# Patient Record
Sex: MALE | Race: WHITE | NOT HISPANIC OR LATINO
[De-identification: names, ages, dates, MRNs, and addresses within clinical notes are randomized per-mention and may not be internally consistent; named-entity substitution may affect disease eponyms.]

---

## 2020-01-09 ENCOUNTER — INBOUND DOCUMENT (OUTPATIENT)
Age: 55
End: 2020-01-09

## 2020-01-09 ENCOUNTER — APPOINTMENT (OUTPATIENT)
Dept: VASCULAR SURGERY | Facility: CLINIC | Age: 55
End: 2020-01-09
Payer: OTHER MISCELLANEOUS

## 2020-01-09 ENCOUNTER — EMERGENCY (EMERGENCY)
Facility: HOSPITAL | Age: 55
LOS: 1 days | Discharge: HOME | End: 2020-01-09
Attending: EMERGENCY MEDICINE | Admitting: EMERGENCY MEDICINE
Payer: OTHER MISCELLANEOUS

## 2020-01-09 VITALS
HEART RATE: 77 BPM | TEMPERATURE: 97 F | OXYGEN SATURATION: 98 % | DIASTOLIC BLOOD PRESSURE: 88 MMHG | RESPIRATION RATE: 18 BRPM | WEIGHT: 244.49 LBS | SYSTOLIC BLOOD PRESSURE: 156 MMHG

## 2020-01-09 VITALS
TEMPERATURE: 98 F | RESPIRATION RATE: 16 BRPM | DIASTOLIC BLOOD PRESSURE: 71 MMHG | OXYGEN SATURATION: 100 % | SYSTOLIC BLOOD PRESSURE: 114 MMHG | HEART RATE: 74 BPM

## 2020-01-09 PROBLEM — Z00.00 ENCOUNTER FOR PREVENTIVE HEALTH EXAMINATION: Status: ACTIVE | Noted: 2020-01-09

## 2020-01-09 PROCEDURE — 75574 CT ANGIO HRT W/3D IMAGE: CPT | Mod: 26

## 2020-01-09 PROCEDURE — 71045 X-RAY EXAM CHEST 1 VIEW: CPT

## 2020-01-09 PROCEDURE — 99284 EMERGENCY DEPT VISIT MOD MDM: CPT | Mod: 25

## 2020-01-09 PROCEDURE — 93010 ELECTROCARDIOGRAM REPORT: CPT

## 2020-01-09 PROCEDURE — 82553 CREATINE MB FRACTION: CPT

## 2020-01-09 PROCEDURE — 85379 FIBRIN DEGRADATION QUANT: CPT

## 2020-01-09 PROCEDURE — 80053 COMPREHEN METABOLIC PANEL: CPT

## 2020-01-09 PROCEDURE — 85610 PROTHROMBIN TIME: CPT

## 2020-01-09 PROCEDURE — 99285 EMERGENCY DEPT VISIT HI MDM: CPT

## 2020-01-09 PROCEDURE — 75574 CT ANGIO HRT W/3D IMAGE: CPT

## 2020-01-09 PROCEDURE — 93970 EXTREMITY STUDY: CPT

## 2020-01-09 PROCEDURE — 85730 THROMBOPLASTIN TIME PARTIAL: CPT

## 2020-01-09 PROCEDURE — 85025 COMPLETE CBC W/AUTO DIFF WBC: CPT

## 2020-01-09 PROCEDURE — 82550 ASSAY OF CK (CPK): CPT

## 2020-01-09 PROCEDURE — 93005 ELECTROCARDIOGRAM TRACING: CPT | Mod: XU

## 2020-01-09 PROCEDURE — 71045 X-RAY EXAM CHEST 1 VIEW: CPT | Mod: 26

## 2020-01-09 PROCEDURE — 84484 ASSAY OF TROPONIN QUANT: CPT

## 2020-01-09 PROCEDURE — 96374 THER/PROPH/DIAG INJ IV PUSH: CPT | Mod: XU

## 2020-01-09 PROCEDURE — 36415 COLL VENOUS BLD VENIPUNCTURE: CPT

## 2020-01-09 RX ORDER — ASPIRIN/CALCIUM CARB/MAGNESIUM 324 MG
1 TABLET ORAL
Qty: 0 | Refills: 0 | DISCHARGE

## 2020-01-09 RX ORDER — KETOROLAC TROMETHAMINE 30 MG/ML
30 SYRINGE (ML) INJECTION ONCE
Refills: 0 | Status: DISCONTINUED | OUTPATIENT
Start: 2020-01-09 | End: 2020-01-09

## 2020-01-09 RX ORDER — AMLODIPINE BESYLATE 2.5 MG/1
0 TABLET ORAL
Qty: 0 | Refills: 0 | DISCHARGE

## 2020-01-09 RX ORDER — METOPROLOL TARTRATE 50 MG
100 TABLET ORAL ONCE
Refills: 0 | Status: COMPLETED | OUTPATIENT
Start: 2020-01-09 | End: 2020-01-09

## 2020-01-09 RX ORDER — ROSUVASTATIN CALCIUM 5 MG/1
0 TABLET ORAL
Qty: 0 | Refills: 0 | DISCHARGE

## 2020-01-09 RX ADMIN — Medication 30 MILLIGRAM(S): at 13:49

## 2020-01-09 RX ADMIN — Medication 100 MILLIGRAM(S): at 14:56

## 2020-01-09 NOTE — ED PROVIDER NOTE - CLINICAL SUMMARY MEDICAL DECISION MAKING FREE TEXT BOX
pt currently getting clearance for r knee surg - had dopplers done today and neg for dvt, presents stating that had some chest twinges earlier and here for eval - no associated sob/dizziness/diaphoresis/syncope, non exertional, cp free in ed, no known hx of cad - has seen cards for clearance, ekg non ischemic, labs wnl -no pe risk factors and neg d dimer, cta coronaries done and no obstructive dz - elevated ca score noted  and discussed w/cards - no inpatient tx indicated - to f/u w/dr rodriguez in office. pt happy w/plan, understands and agrees, strict return precautions given

## 2020-01-09 NOTE — ED PROVIDER NOTE - PATIENT PORTAL LINK FT
You can access the FollowMyHealth Patient Portal offered by [facility] by registering at the following website: [email or web address]. By joining [facility]’s FollowMyHealth portal, you will also be able to view your health information using other applications (apps) compatible with our system.

## 2020-01-09 NOTE — ED ADULT NURSE REASSESSMENT NOTE - NS ED NURSE REASSESS COMMENT FT1
report given to Michael Colon from CTA holding. PT pending transfer to [facility] w/o monitor as per physician.

## 2020-01-09 NOTE — ED ADULT NURSE NOTE - OBJECTIVE STATEMENT
Pt came to ED from US, c/o midsternal chest pressure that lasted a few minutes while getting US done of his right lower leg today.  Denies cough, fever, chills.  Also c/o BLE swelling that started a few days ago.  Pt denies D/N/V, fever or chills.  Chest pressure is intermittent.  Pt speaking in complete, clear sentences, lungs sounds clear bilaterally.  Pt is alert and oriented x3.

## 2020-01-09 NOTE — ED PROVIDER NOTE - ATTENDING CONTRIBUTION TO CARE
I discussed the plan of care of the patient directly with the PA and examined the patient while in the Emergency Department. I agree with the HPI and PE as documented by the PA.  Pt p/w swelling to lle, seen by orthopedist and arranged for doppler to r/o dvt. While undergoing us, pt developed chest pain. Pt has been seen by a cardiologist recently and received cardiac clearance for upcoming orthopedic surgery. Pt is cp free in ed. + anxious. Trop and d-dimer neg. Pt arranged for cardiac cta to r/o obstructive dz. Will awaiting cta results for dispo. I discussed the plan of care of the patient directly with the PA and examined the patient while in the Emergency Department. I agree with the HPI and PE as documented by the PA.  Pt p/w chest pain described as "twinges" to chest, while obtaining dopplers of lower ext today. + anxious. no associated sob, palp, dizziness, syncope, back pain/ radiation. Pt is cp free in ed. Afebrile. HDS. WNWD m in nad. + anxious. + s1, s2, rrr. Lungs cta b/l. Abd benign. 2+ pulses b/l. Trop and d-dimer neg. Pt arranged for cardiac cta to r/o obstructive dz. Dispo pending cta results and pt re-assessment.

## 2020-01-09 NOTE — ED PROVIDER NOTE - PROGRESS NOTE DETAILS
[place]: received s/o pending CT results. Pt feeling much better. CT w/ no acute pathology. Does whow "Mild aneurysmal dilatation of the aortic root measuring up to 4.2 cm." Clinically no indication for further emergent ED workup or hospitalization at this time. Comfortable for dc, outpt f/u.

## 2020-01-09 NOTE — ED PROVIDER NOTE - CARE PROVIDER_API CALL
Blanquita Breen)  Cardiovascular Disease; Internal Medicine  [address] [place] [address]  Phone: [phone]  Fax: [phone]  Follow Up Time:

## 2020-01-09 NOTE — ED PROVIDER NOTE - OBJECTIVE STATEMENT
The pt is a 55 y/o M, who presents to ED stating he's getting clearance for elective knee surg, has been under lots of stress and today having "twinges" in chest - pt has seen cards in anticipation of surg and told that he's fine. Pt had neg dopplers today (of leg for ortho clearance). Pt states pain is 2/10, feels like "twinges", not related to activity, worse w/mov, non radiating, has not taken any pain meds. Denies sob, dyspnea, palpitations, n/v, dizziness, syncope, fevers, chills

## 2020-01-09 NOTE — ED ADULT TRIAGE NOTE - CHIEF COMPLAINT QUOTE
c/o midsternal chest pressure that lasted a few minutes while getting US done of his right lower leg today.  Denies cough, fever, chills.  Also c/o BLE swelling that started a few days ago

## 2020-01-09 NOTE — ED ADULT NURSE NOTE - CHPI ED NUR SYMPTOMS NEG
no chills/no back pain/no syncope/no diaphoresis/no shortness of breath/no dizziness/no nausea/no fever/no vomiting/no congestion

## 2020-01-10 PROBLEM — I10 ESSENTIAL (PRIMARY) HYPERTENSION: Chronic | Status: ACTIVE | Noted: 2020-01-09

## 2020-01-10 PROBLEM — E78.00 PURE HYPERCHOLESTEROLEMIA, UNSPECIFIED: Chronic | Status: ACTIVE | Noted: 2020-01-09

## 2020-01-16 ENCOUNTER — APPOINTMENT (OUTPATIENT)
Dept: HEART AND VASCULAR | Facility: CLINIC | Age: 55
End: 2020-01-16
Payer: COMMERCIAL

## 2020-01-16 ENCOUNTER — NON-APPOINTMENT (OUTPATIENT)
Age: 55
End: 2020-01-16

## 2020-01-16 VITALS
DIASTOLIC BLOOD PRESSURE: 68 MMHG | SYSTOLIC BLOOD PRESSURE: 104 MMHG | BODY MASS INDEX: 33.6 KG/M2 | HEIGHT: 71 IN | WEIGHT: 240 LBS | HEART RATE: 76 BPM | OXYGEN SATURATION: 98 %

## 2020-01-16 DIAGNOSIS — I25.10 ATHEROSCLEROTIC HEART DISEASE OF NATIVE CORONARY ARTERY W/OUT ANGINA PECTORIS: ICD-10-CM

## 2020-01-16 DIAGNOSIS — R07.89 OTHER CHEST PAIN: ICD-10-CM

## 2020-01-16 DIAGNOSIS — I10 ESSENTIAL (PRIMARY) HYPERTENSION: ICD-10-CM

## 2020-01-16 DIAGNOSIS — Z79.899 OTHER LONG TERM (CURRENT) DRUG THERAPY: ICD-10-CM

## 2020-01-16 PROCEDURE — 99202 OFFICE O/P NEW SF 15 MIN: CPT

## 2020-01-16 PROCEDURE — 93000 ELECTROCARDIOGRAM COMPLETE: CPT

## 2020-01-16 RX ORDER — ASPIRIN 81 MG/1
81 TABLET, DELAYED RELEASE ORAL DAILY
Qty: 90 | Refills: 3 | Status: ACTIVE | COMMUNITY
Start: 2020-01-16

## 2020-01-16 RX ORDER — AMLODIPINE BESYLATE 5 MG/1
5 TABLET ORAL DAILY
Qty: 90 | Refills: 3 | Status: ACTIVE | COMMUNITY
Start: 2020-01-16

## 2020-01-16 RX ORDER — ROSUVASTATIN CALCIUM 20 MG/1
20 TABLET, FILM COATED ORAL DAILY
Qty: 90 | Refills: 3 | Status: ACTIVE | COMMUNITY
Start: 2020-01-16

## 2020-01-20 NOTE — HISTORY OF PRESENT ILLNESS
[FreeTextEntry1] : \par 54 M with stable CAD on OMT no new complaints\par Interval Symptoms: EKG today unchanged from prior (in chart)\par usual state of health, no new complaints\par SOB - no\par chest pain - no\par location: chest\par duration: none\par modifying factors: none\par timing: none\par severity: 0/10\par Medications: the patient is adherent with his medication regimen. denies medication side effects. \par Disease Management: the patient is doing well with goals.

## 2020-01-20 NOTE — PHYSICAL EXAM
[Normal Appearance] : normal appearance [General Appearance - Well Developed] : well developed [General Appearance - Well Nourished] : well nourished [No Deformities] : no deformities [Well Groomed] : well groomed [Normal Conjunctiva] : the conjunctiva exhibited no abnormalities [General Appearance - In No Acute Distress] : no acute distress [No Oral Pallor] : no oral pallor [Normal Oral Mucosa] : normal oral mucosa [Eyelids - No Xanthelasma] : the eyelids demonstrated no xanthelasmas [No Oral Cyanosis] : no oral cyanosis [Normal Jugular Venous A Waves Present] : normal jugular venous A waves present [Normal Jugular Venous V Waves Present] : normal jugular venous V waves present [Heart Rate And Rhythm] : heart rate and rhythm were normal [No Jugular Venous Seth A Waves] : no jugular venous seth A waves [Heart Sounds] : normal S1 and S2 [Murmurs] : no murmurs present [Respiration, Rhythm And Depth] : normal respiratory rhythm and effort [Auscultation Breath Sounds / Voice Sounds] : lungs were clear to auscultation bilaterally [Exaggerated Use Of Accessory Muscles For Inspiration] : no accessory muscle use [Abdomen Soft] : soft [Abdomen Tenderness] : non-tender [Abdomen Mass (___ Cm)] : no abdominal mass palpated [Nail Clubbing] : no clubbing of the fingernails [Gait - Sufficient For Exercise Testing] : the gait was sufficient for exercise testing [Abnormal Walk] : normal gait [Petechial Hemorrhages (___cm)] : no petechial hemorrhages [Cyanosis, Localized] : no localized cyanosis [Skin Color & Pigmentation] : normal skin color and pigmentation [] : no rash [No Venous Stasis] : no venous stasis [No Xanthoma] : no  xanthoma was observed [No Skin Ulcers] : no skin ulcer [Skin Lesions] : no skin lesions [Mood] : the mood was normal [Oriented To Time, Place, And Person] : oriented to person, place, and time [Affect] : the affect was normal [No Anxiety] : not feeling anxious

## 2020-01-20 NOTE — DISCUSSION/SUMMARY
[Stable] : stable [Stable Angina] : stable angina [Outpatient Evaluation] : outpatient evaluation [Echocardiogram] : echocardiogram [Medication Changes Per Orders] : as documented in orders

## 2020-06-11 ENCOUNTER — APPOINTMENT (OUTPATIENT)
Dept: HEART AND VASCULAR | Facility: CLINIC | Age: 55
End: 2020-06-11

## 2021-02-09 ENCOUNTER — APPOINTMENT (OUTPATIENT)
Dept: HEART AND VASCULAR | Facility: CLINIC | Age: 56
End: 2021-02-09

## 2021-02-18 NOTE — ED PROVIDER NOTE - MUSCULOSKELETAL, MLM
Transition of care outreach postponed for 7 days due to patient's discharge to inpatient rehab facility.   [facility] 1/18-2/17/21 Acute resp fx hyoixia d/c [facility] f/u 7d Spine appears normal, range of motion is not limited, no muscle or joint tenderness; LE no calf tend b/l, FROM b/l, pulses 2+ b/l

## 2022-01-04 NOTE — ED ADULT NURSE NOTE - NS ED NURSE IV DC DT
In the event of a negative COVID PCR, discontinue quarantine if the following conditions have been met:  -    No fever (<99.0 F/37.2 C) for at least 24 hours without the use of fever-reducing medications          AND  -    Respiratory symptoms have improved or resolved (e.g. cough, shortness of breath)     In the event of a positive COVID PCR, regardless of vaccination status:  • Stay home for 5 days from onset of symptoms.  • If you have no symptoms or your symptoms are resolving after 5 days, you can leave your house.  • Continue to wear a mask around others for 5 additional days.    
09-Jan-2020 18:25

## 2023-04-19 ENCOUNTER — APPOINTMENT (OUTPATIENT)
Dept: ORTHOPEDIC SURGERY | Facility: CLINIC | Age: 58
End: 2023-04-19
Payer: OTHER MISCELLANEOUS

## 2023-04-19 DIAGNOSIS — S99.921S UNSPECIFIED INJURY OF RIGHT FOOT, SEQUELA: ICD-10-CM

## 2023-04-19 PROCEDURE — 99204 OFFICE O/P NEW MOD 45 MIN: CPT

## 2023-04-19 PROCEDURE — 73630 X-RAY EXAM OF FOOT: CPT | Mod: RT

## 2023-04-19 PROCEDURE — 73610 X-RAY EXAM OF ANKLE: CPT | Mod: LT

## 2023-04-19 NOTE — HISTORY OF PRESENT ILLNESS
[de-identified] : This is a very pleasant 58-year-old patient well-known to me from my prior practice who presents to establish care here at my new practice.  He suffered a workplace injury which occurred on April 6, 2021.  The injury sustained at that time was an injury to the plantar plate of his right second MTP joint.  We have been treating this with a combination of anti-inflammatory medication, taping, immobilization.  Unfortunately he is still experiencing significant pain involving the right second MTP joint.  In addition secondary to his injury on the right side this has exacerbated prior left ankle pain.  Before the injury this was minimally symptomatic however this is got more more painful for him.  We have also tried injections as well as bracing and anti-inflammatory medication, none of which is giving him any significant relief.

## 2023-04-19 NOTE — DATA REVIEWED
[FreeTextEntry1] : 3 views the patient's right foot and left ankle were ordered and reviewed by me personally.  The x-rays on the right ankle do demonstrate evidence of dorsal subluxation present at the second MTP joint along with a hammertoe.  There is flattening of the second metatarsal head suggesting chondral damage.  Left ankle x-rays do demonstrate evidence of asymmetric tibiotalar joint arthritis with varus tilt and anterior translation of the talus relative to the tibial plafond.  Osteophytes present.  No fracture or dislocation.

## 2023-04-19 NOTE — DISCUSSION/SUMMARY
[de-identified] : I had a lengthy discussion with the patient regarding his condition and treatment options available.  Patient is 100% temporarily disabled.  In regards to his right second toe he has so far failed nonoperative management and wishes to proceed ahead with surgical treatment.  Surgical treatment for his right foot would consist of a Weil osteotomy, possible plantar plate repair, second hammertoe correction, extensor tendon lengthening and dorsal capsulotomy.  We did discuss the risks of this surgery which include but are not limited to infection, need for revision surgery, wound breakdown, hardware infection, symptomatic hardware, recurrence, DVT, PE, loss of limb, loss of life.\par \par We also discussed surgery of the left ankle.  In my opinion the left ankle symptoms are causally related to the right foot injury.  We will have to obtain more advanced imaging but surgery for the left ankle would likely involve a lateral ligament reconstruction with possible total ankle arthroplasty.  All his questions were answered at this time.

## 2023-07-20 ENCOUNTER — FORM ENCOUNTER (OUTPATIENT)
Age: 58
End: 2023-07-20

## 2023-08-04 NOTE — ED PROVIDER NOTE - CPE EDP ENMT NORM
normal... Epidermal Autograft Text: The defect edges were debeveled with a #15 scalpel blade. Given the location of the defect, shape of the defect and the proximity to free margins an epidermal autograft was deemed most appropriate. Using a sterile surgical marker, the primary defect shape was transferred to the donor site. The epidermal graft was then harvested. The skin graft was then placed in the primary defect and oriented appropriately.

## 2023-09-13 ENCOUNTER — APPOINTMENT (OUTPATIENT)
Dept: ORTHOPEDIC SURGERY | Facility: CLINIC | Age: 58
End: 2023-09-13
Payer: OTHER MISCELLANEOUS

## 2023-09-13 DIAGNOSIS — M20.5X1 OTHER DEFORMITIES OF TOE(S) (ACQUIRED), RIGHT FOOT: ICD-10-CM

## 2023-09-13 PROCEDURE — 99213 OFFICE O/P EST LOW 20 MIN: CPT | Mod: 25

## 2023-09-13 PROCEDURE — 20605 DRAIN/INJ JOINT/BURSA W/O US: CPT | Mod: LT

## 2024-01-29 ENCOUNTER — APPOINTMENT (OUTPATIENT)
Dept: ORTHOPEDIC SURGERY | Facility: CLINIC | Age: 59
End: 2024-01-29
Payer: OTHER MISCELLANEOUS

## 2024-01-29 PROCEDURE — 73630 X-RAY EXAM OF FOOT: CPT | Mod: RT

## 2024-01-29 PROCEDURE — 99214 OFFICE O/P EST MOD 30 MIN: CPT

## 2024-02-25 NOTE — DISCUSSION/SUMMARY
[de-identified] : I had a lengthy discussion with the patient regarding his conditions and treatment options available.  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  His ankle condition is likely secondary and certainly has been exacerbated by his workplace environment which requires standing for long periods of time along with lifting, climbing and other activities which increase load across the ankle joint.  The patient would like to proceed forward surgical management.  Surgical management for him would consist of a gastrocnemius recession, total ankle arthroplasty, lateral ankle ligament reconstruction.  Prior to surgery the patient would need to obtain a specialized CT scan of the ankle in order to provide a template for the custom printed cutting blocks necessary for surgery.  Once again the purpose of the CT scan would be for presurgical planning.  We discussed the risks of surgery which included but were not limited to infection, need for revision surgery, persistent pain, failure surgery, DVT, PE, loss of limb, loss of life.  Patient would like to proceed forward surgery.  All questions answered.

## 2024-02-25 NOTE — HISTORY OF PRESENT ILLNESS
[de-identified] : 58-year-old patient comes in today for eval of his left ankle.  He is continuing to have severe pain with weightbearing.  This is all secondary to his workplace environment which requires increased load across the ankle thereby exacerbating and worsening his posttraumatic arthritis.  He has failed all forms of nonoperative management so far including injections, bracing, physical therapy, anti-inflammatory medication.  He wants to proceed forward with surgery.

## 2024-02-25 NOTE — DATA REVIEWED
[FreeTextEntry1] : I reviewed the patient's prior x-rays.  X-rays consistent with advanced tibiotalar joint arthritis with evidence of varus deformity.  There is anterior subluxation of the talus relative to the tibia.

## 2024-02-25 NOTE — PHYSICAL EXAM
[de-identified] : He is alert oriented x 3.  Is pleasant cooperative.  I examined his left lower extremity.  Patient demonstrates mild varus deformity centered at the ankle joint.  His hindfoot is mobile.  He has limited range of motion of the ankle from about neutral dorsiflexion to 30 degrees plantarflexion.  There is pain throughout the entire arc of motion.  There is unstable ankle with anterior drawer and talar tilt test.  He is neurovascular intact distally.  Tender to palpation at the tibiotalar joint alone.

## 2024-06-24 ENCOUNTER — APPOINTMENT (OUTPATIENT)
Dept: ORTHOPEDIC SURGERY | Facility: CLINIC | Age: 59
End: 2024-06-24
Payer: OTHER MISCELLANEOUS

## 2024-06-24 DIAGNOSIS — M19.072 PRIMARY OSTEOARTHRITIS, LEFT ANKLE AND FOOT: ICD-10-CM

## 2024-06-24 PROCEDURE — 99214 OFFICE O/P EST MOD 30 MIN: CPT

## 2024-08-30 ENCOUNTER — OUTPATIENT (OUTPATIENT)
Dept: OUTPATIENT SERVICES | Facility: HOSPITAL | Age: 59
LOS: 1 days | End: 2024-08-30
Payer: COMMERCIAL

## 2024-08-30 ENCOUNTER — RESULT REVIEW (OUTPATIENT)
Age: 59
End: 2024-08-30

## 2024-08-30 VITALS
RESPIRATION RATE: 17 BRPM | HEART RATE: 58 BPM | OXYGEN SATURATION: 97 % | TEMPERATURE: 97 F | SYSTOLIC BLOOD PRESSURE: 146 MMHG | HEIGHT: 71 IN | WEIGHT: 235.01 LBS | DIASTOLIC BLOOD PRESSURE: 70 MMHG

## 2024-08-30 DIAGNOSIS — M19.072 PRIMARY OSTEOARTHRITIS, LEFT ANKLE AND FOOT: ICD-10-CM

## 2024-08-30 DIAGNOSIS — Z01.818 ENCOUNTER FOR OTHER PREPROCEDURAL EXAMINATION: ICD-10-CM

## 2024-08-30 DIAGNOSIS — Z98.890 OTHER SPECIFIED POSTPROCEDURAL STATES: Chronic | ICD-10-CM

## 2024-08-30 LAB
A1C WITH ESTIMATED AVERAGE GLUCOSE RESULT: 5.5 % — SIGNIFICANT CHANGE UP (ref 4–5.6)
ALBUMIN SERPL ELPH-MCNC: 4.8 G/DL — SIGNIFICANT CHANGE UP (ref 3.5–5.2)
ALP SERPL-CCNC: 111 U/L — SIGNIFICANT CHANGE UP (ref 30–115)
ALT FLD-CCNC: 14 U/L — SIGNIFICANT CHANGE UP (ref 0–41)
ANION GAP SERPL CALC-SCNC: 12 MMOL/L — SIGNIFICANT CHANGE UP (ref 7–14)
APTT BLD: 34.1 SEC — SIGNIFICANT CHANGE UP (ref 27–39.2)
AST SERPL-CCNC: 18 U/L — SIGNIFICANT CHANGE UP (ref 0–41)
BASOPHILS # BLD AUTO: 0.07 K/UL — SIGNIFICANT CHANGE UP (ref 0–0.2)
BASOPHILS NFR BLD AUTO: 0.7 % — SIGNIFICANT CHANGE UP (ref 0–1)
BILIRUB SERPL-MCNC: 0.5 MG/DL — SIGNIFICANT CHANGE UP (ref 0.2–1.2)
BLD GP AB SCN SERPL QL: SIGNIFICANT CHANGE UP
BUN SERPL-MCNC: 13 MG/DL — SIGNIFICANT CHANGE UP (ref 10–20)
CALCIUM SERPL-MCNC: 9.8 MG/DL — SIGNIFICANT CHANGE UP (ref 8.4–10.5)
CHLORIDE SERPL-SCNC: 103 MMOL/L — SIGNIFICANT CHANGE UP (ref 98–110)
CO2 SERPL-SCNC: 26 MMOL/L — SIGNIFICANT CHANGE UP (ref 17–32)
CREAT SERPL-MCNC: 0.8 MG/DL — SIGNIFICANT CHANGE UP (ref 0.7–1.5)
EGFR: 101 ML/MIN/1.73M2 — SIGNIFICANT CHANGE UP
EOSINOPHIL # BLD AUTO: 0.19 K/UL — SIGNIFICANT CHANGE UP (ref 0–0.7)
EOSINOPHIL NFR BLD AUTO: 1.8 % — SIGNIFICANT CHANGE UP (ref 0–8)
ESTIMATED AVERAGE GLUCOSE: 111 MG/DL — SIGNIFICANT CHANGE UP (ref 68–114)
GLUCOSE SERPL-MCNC: 87 MG/DL — SIGNIFICANT CHANGE UP (ref 70–99)
HCT VFR BLD CALC: 46.1 % — SIGNIFICANT CHANGE UP (ref 42–52)
HGB BLD-MCNC: 15.4 G/DL — SIGNIFICANT CHANGE UP (ref 14–18)
IMM GRANULOCYTES NFR BLD AUTO: 0.5 % — HIGH (ref 0.1–0.3)
INR BLD: 1.04 RATIO — SIGNIFICANT CHANGE UP (ref 0.65–1.3)
LYMPHOCYTES # BLD AUTO: 2.65 K/UL — SIGNIFICANT CHANGE UP (ref 1.2–3.4)
LYMPHOCYTES # BLD AUTO: 25.6 % — SIGNIFICANT CHANGE UP (ref 20.5–51.1)
MCHC RBC-ENTMCNC: 29 PG — SIGNIFICANT CHANGE UP (ref 27–31)
MCHC RBC-ENTMCNC: 33.4 G/DL — SIGNIFICANT CHANGE UP (ref 32–37)
MCV RBC AUTO: 86.8 FL — SIGNIFICANT CHANGE UP (ref 80–94)
MONOCYTES # BLD AUTO: 0.73 K/UL — HIGH (ref 0.1–0.6)
MONOCYTES NFR BLD AUTO: 7.1 % — SIGNIFICANT CHANGE UP (ref 1.7–9.3)
MRSA PCR RESULT.: NEGATIVE — SIGNIFICANT CHANGE UP
NEUTROPHILS # BLD AUTO: 6.65 K/UL — HIGH (ref 1.4–6.5)
NEUTROPHILS NFR BLD AUTO: 64.3 % — SIGNIFICANT CHANGE UP (ref 42.2–75.2)
NRBC # BLD: 0 /100 WBCS — SIGNIFICANT CHANGE UP (ref 0–0)
PLATELET # BLD AUTO: 241 K/UL — SIGNIFICANT CHANGE UP (ref 130–400)
PMV BLD: 9.7 FL — SIGNIFICANT CHANGE UP (ref 7.4–10.4)
POTASSIUM SERPL-MCNC: 4.5 MMOL/L — SIGNIFICANT CHANGE UP (ref 3.5–5)
POTASSIUM SERPL-SCNC: 4.5 MMOL/L — SIGNIFICANT CHANGE UP (ref 3.5–5)
PROT SERPL-MCNC: 7.4 G/DL — SIGNIFICANT CHANGE UP (ref 6–8)
PROTHROM AB SERPL-ACNC: 11.9 SEC — SIGNIFICANT CHANGE UP (ref 9.95–12.87)
RBC # BLD: 5.31 M/UL — SIGNIFICANT CHANGE UP (ref 4.7–6.1)
RBC # FLD: 14.2 % — SIGNIFICANT CHANGE UP (ref 11.5–14.5)
SODIUM SERPL-SCNC: 141 MMOL/L — SIGNIFICANT CHANGE UP (ref 135–146)
WBC # BLD: 10.34 K/UL — SIGNIFICANT CHANGE UP (ref 4.8–10.8)
WBC # FLD AUTO: 10.34 K/UL — SIGNIFICANT CHANGE UP (ref 4.8–10.8)

## 2024-08-30 PROCEDURE — 87640 STAPH A DNA AMP PROBE: CPT

## 2024-08-30 PROCEDURE — 73610 X-RAY EXAM OF ANKLE: CPT | Mod: LT

## 2024-08-30 PROCEDURE — 99214 OFFICE O/P EST MOD 30 MIN: CPT | Mod: 25

## 2024-08-30 PROCEDURE — 85025 COMPLETE CBC W/AUTO DIFF WBC: CPT

## 2024-08-30 PROCEDURE — 36415 COLL VENOUS BLD VENIPUNCTURE: CPT

## 2024-08-30 PROCEDURE — 85730 THROMBOPLASTIN TIME PARTIAL: CPT

## 2024-08-30 PROCEDURE — 85610 PROTHROMBIN TIME: CPT

## 2024-08-30 PROCEDURE — 86901 BLOOD TYPING SEROLOGIC RH(D): CPT

## 2024-08-30 PROCEDURE — 80053 COMPREHEN METABOLIC PANEL: CPT

## 2024-08-30 PROCEDURE — 86900 BLOOD TYPING SEROLOGIC ABO: CPT

## 2024-08-30 PROCEDURE — 93005 ELECTROCARDIOGRAM TRACING: CPT

## 2024-08-30 PROCEDURE — 83036 HEMOGLOBIN GLYCOSYLATED A1C: CPT

## 2024-08-30 PROCEDURE — 93010 ELECTROCARDIOGRAM REPORT: CPT

## 2024-08-30 PROCEDURE — 86850 RBC ANTIBODY SCREEN: CPT

## 2024-08-30 PROCEDURE — 87641 MR-STAPH DNA AMP PROBE: CPT

## 2024-08-30 PROCEDURE — 73610 X-RAY EXAM OF ANKLE: CPT | Mod: 26,LT

## 2024-08-30 RX ORDER — METOPROLOL TARTRATE 100 MG/1
1 TABLET ORAL
Refills: 0 | DISCHARGE

## 2024-08-30 RX ORDER — ROSUVASTATIN CALCIUM 10 MG/1
1 TABLET ORAL
Refills: 0 | DISCHARGE

## 2024-08-30 RX ORDER — METHOCARBAMOL 750 MG/1
2 TABLET, FILM COATED ORAL
Refills: 0 | DISCHARGE

## 2024-08-30 RX ORDER — AMLODIPINE BESYLATE 10 MG/1
1 TABLET ORAL
Refills: 0 | DISCHARGE

## 2024-08-30 RX ORDER — AMITRIPTYLINE HCL 25 MG
1 TABLET ORAL
Refills: 0 | DISCHARGE

## 2024-08-30 RX ORDER — IBUPROFEN 600 MG
1 TABLET ORAL
Refills: 0 | DISCHARGE

## 2024-08-30 NOTE — H&P PST ADULT - NSICDXPASTMEDICALHX_GEN_ALL_CORE_FT
PAST MEDICAL HISTORY:  Depression     H/O insomnia     High cholesterol     Hypertension     Migraine headache     Obesity (BMI 30-39.9)     JOSE ROBERTO on CPAP     Smoker

## 2024-08-30 NOTE — H&P PST ADULT - TEMPERATURE IN CELSIUS (DEGREES C)
36.1 Bactrim Counseling:  I discussed with the patient the risks of sulfa antibiotics including but not limited to GI upset, allergic reaction, drug rash, diarrhea, dizziness, photosensitivity, and yeast infections.  Rarely, more serious reactions can occur including but not limited to aplastic anemia, agranulocytosis, methemoglobinemia, blood dyscrasias, liver or kidney failure, lung infiltrates or desquamative/blistering drug rashes.

## 2024-08-30 NOTE — H&P PST ADULT - HISTORY OF PRESENT ILLNESS
PATIENT DENIES CHEST PAIN, SHORTNESS OF BREATH, PALPITATIONS, COUGHING, FEVER, DYSURIA.    NO COUGH, FEVER, SORE THROAT, HEADACHE, LOSS OF TASTE OR SMELL. NO KNOWN EXPOSURE TO ANYONE WITH COVID. PATIENT WAS INSTRUCTED TO ISOLATE FROM NOW UNTIL THE SURGERY.    Anesthesia Alert  + Difficult Airway (CLASS IV)  NO--History of neck surgery or radiation  NO--Limited ROM of neck  NO--History of Malignant hyperthermia  NO--Personal or family history of Pseudocholinesterase deficiency  NO--Prior Anesthesia Complication  NO--Latex Allergy  NO--Loose teeth  NO--History of Rheumatoid Arthritis  + JOSE ROBERTO (CPAP)  NO--BLEEDING RISK    DASI=9.89 METS (ONLY LIMITED TO LEFT ANKLE PAIN)  RCRI=0

## 2024-08-30 NOTE — H&P PST ADULT - REASON FOR ADMISSION
60 Y/O MALE HERE FOR PRE-ADMISSION SURGICAL TESTING. PATIENT REPORTS HE'S HAD MULTIPLE ANKLE INJURIES THROUGHOUT THE YEARS WITH THE  AND ALSO ON THE JOB WITH [employer]. HE HAD AN ACCIDENT 4/6/2021. HIS BOTH FEET GOT TRAPPED UNDERNEATH FLOOR BOARD AND HE FELL, LANDING ON HIS FACE. HE INJURED HIS LEFT ANKLE. HE STATES, "I HAVE A LOT OF ARTHRITIS FROM MULTIPLE INJURIES TO THE LEFT ANKLE". HE STATES IT'S HARD TO WALK UP STAIRS. HE HAS FALLEN 3 TIMES DUE TO INSTABILITY OF THE ANKLE. HE DID P/T, BUT NOW WORKMAN'S [employer] ISN'T APPROVING P/T. PAIN IS 9/10, SHARP. HE TAKES IBUPROFEN WITH MINIMAL RELIEF.  NOW FOR SCHEDULED LEFT TOTAL ANKLE ARTHROSCOPY.

## 2024-08-31 DIAGNOSIS — M19.072 PRIMARY OSTEOARTHRITIS, LEFT ANKLE AND FOOT: ICD-10-CM

## 2024-08-31 DIAGNOSIS — Z01.818 ENCOUNTER FOR OTHER PREPROCEDURAL EXAMINATION: ICD-10-CM

## 2024-09-10 ENCOUNTER — APPOINTMENT (OUTPATIENT)
Dept: ORTHOPEDIC SURGERY | Facility: HOSPITAL | Age: 59
End: 2024-09-10

## 2024-09-10 ENCOUNTER — INPATIENT (INPATIENT)
Facility: HOSPITAL | Age: 59
LOS: 1 days | Discharge: NURSING, REHAB OR LONG-TERM CARE | DRG: 351 | End: 2024-09-12
Attending: ORTHOPAEDIC SURGERY | Admitting: ORTHOPAEDIC SURGERY
Payer: COMMERCIAL

## 2024-09-10 ENCOUNTER — RESULT REVIEW (OUTPATIENT)
Age: 59
End: 2024-09-10

## 2024-09-10 VITALS
WEIGHT: 229.94 LBS | RESPIRATION RATE: 18 BRPM | OXYGEN SATURATION: 99 % | DIASTOLIC BLOOD PRESSURE: 65 MMHG | HEART RATE: 71 BPM | SYSTOLIC BLOOD PRESSURE: 154 MMHG | HEIGHT: 71 IN | TEMPERATURE: 97 F

## 2024-09-10 DIAGNOSIS — Z98.890 OTHER SPECIFIED POSTPROCEDURAL STATES: Chronic | ICD-10-CM

## 2024-09-10 DIAGNOSIS — M19.072 PRIMARY OSTEOARTHRITIS, LEFT ANKLE AND FOOT: ICD-10-CM

## 2024-09-10 PROCEDURE — 27698 REPAIR OF ANKLE LIGAMENT: CPT | Mod: LT

## 2024-09-10 PROCEDURE — C1889: CPT

## 2024-09-10 PROCEDURE — C1776: CPT

## 2024-09-10 PROCEDURE — 27702 RECONSTRUCT ANKLE JOINT: CPT | Mod: LT

## 2024-09-10 PROCEDURE — 88304 TISSUE EXAM BY PATHOLOGIST: CPT

## 2024-09-10 PROCEDURE — 97110 THERAPEUTIC EXERCISES: CPT | Mod: GP

## 2024-09-10 PROCEDURE — 88311 DECALCIFY TISSUE: CPT | Mod: 26

## 2024-09-10 PROCEDURE — 27687 REVISION OF CALF TENDON: CPT | Mod: LT

## 2024-09-10 PROCEDURE — C1734: CPT

## 2024-09-10 PROCEDURE — 97162 PT EVAL MOD COMPLEX 30 MIN: CPT | Mod: GP

## 2024-09-10 PROCEDURE — C9399: CPT

## 2024-09-10 PROCEDURE — 97165 OT EVAL LOW COMPLEX 30 MIN: CPT | Mod: GO

## 2024-09-10 PROCEDURE — 73600 X-RAY EXAM OF ANKLE: CPT | Mod: LT

## 2024-09-10 PROCEDURE — 73610 X-RAY EXAM OF ANKLE: CPT | Mod: 26,LT

## 2024-09-10 PROCEDURE — C1713: CPT

## 2024-09-10 PROCEDURE — 88304 TISSUE EXAM BY PATHOLOGIST: CPT | Mod: 26

## 2024-09-10 PROCEDURE — 73610 X-RAY EXAM OF ANKLE: CPT | Mod: LT

## 2024-09-10 PROCEDURE — 88311 DECALCIFY TISSUE: CPT

## 2024-09-10 PROCEDURE — 97116 GAIT TRAINING THERAPY: CPT | Mod: GP

## 2024-09-10 RX ORDER — OXYCODONE HYDROCHLORIDE 5 MG/1
5 TABLET ORAL EVERY 4 HOURS
Refills: 0 | Status: DISCONTINUED | OUTPATIENT
Start: 2024-09-10 | End: 2024-09-10

## 2024-09-10 RX ORDER — CHLORHEXIDINE GLUCONATE 40 MG/ML
1 SOLUTION TOPICAL
Refills: 0 | Status: DISCONTINUED | OUTPATIENT
Start: 2024-09-10 | End: 2024-09-12

## 2024-09-10 RX ORDER — FLU VACCINE TS 2012-2013(5YR+) 45MCG/.5ML
0.5 VIAL (ML) INTRAMUSCULAR ONCE
Refills: 0 | Status: DISCONTINUED | OUTPATIENT
Start: 2024-09-10 | End: 2024-09-12

## 2024-09-10 RX ORDER — MEPERIDINE HYDROCHLORIDE 50 MG/1
12.5 TABLET ORAL ONCE
Refills: 0 | Status: DISCONTINUED | OUTPATIENT
Start: 2024-09-10 | End: 2024-09-12

## 2024-09-10 RX ORDER — CEFAZOLIN SODIUM 2 G/100ML
2000 INJECTION, SOLUTION INTRAVENOUS EVERY 8 HOURS
Refills: 0 | Status: COMPLETED | OUTPATIENT
Start: 2024-09-10 | End: 2024-09-11

## 2024-09-10 RX ORDER — ONDANSETRON 2 MG/ML
4 INJECTION, SOLUTION INTRAMUSCULAR; INTRAVENOUS ONCE
Refills: 0 | Status: DISCONTINUED | OUTPATIENT
Start: 2024-09-10 | End: 2024-09-12

## 2024-09-10 RX ORDER — ACETAMINOPHEN 325 MG/1
650 TABLET ORAL EVERY 6 HOURS
Refills: 0 | Status: DISCONTINUED | OUTPATIENT
Start: 2024-09-10 | End: 2024-09-12

## 2024-09-10 RX ORDER — OXYCODONE HYDROCHLORIDE 5 MG/1
10 TABLET ORAL EVERY 12 HOURS
Refills: 0 | Status: DISCONTINUED | OUTPATIENT
Start: 2024-09-10 | End: 2024-09-10

## 2024-09-10 RX ORDER — ENOXAPARIN SODIUM 100 MG/ML
40 INJECTION SUBCUTANEOUS EVERY 24 HOURS
Refills: 0 | Status: DISCONTINUED | OUTPATIENT
Start: 2024-09-10 | End: 2024-09-12

## 2024-09-10 RX ORDER — IBUPROFEN 600 MG
400 TABLET ORAL EVERY 8 HOURS
Refills: 0 | Status: DISCONTINUED | OUTPATIENT
Start: 2024-09-10 | End: 2024-09-12

## 2024-09-10 RX ORDER — OXYCODONE HYDROCHLORIDE 5 MG/1
10 TABLET ORAL EVERY 4 HOURS
Refills: 0 | Status: DISCONTINUED | OUTPATIENT
Start: 2024-09-10 | End: 2024-09-10

## 2024-09-10 RX ORDER — DIPHENHYDRAMINE HCL 50 MG
25 CAPSULE ORAL AT BEDTIME
Refills: 0 | Status: DISCONTINUED | OUTPATIENT
Start: 2024-09-10 | End: 2024-09-12

## 2024-09-10 RX ORDER — HYDROMORPHONE HYDROCHLORIDE 2 MG/1
0.5 TABLET ORAL
Refills: 0 | Status: DISCONTINUED | OUTPATIENT
Start: 2024-09-10 | End: 2024-09-10

## 2024-09-10 RX ORDER — TRAMADOL HYDROCHLORIDE 200 MG/1
50 TABLET, EXTENDED RELEASE ORAL EVERY 6 HOURS
Refills: 0 | Status: DISCONTINUED | OUTPATIENT
Start: 2024-09-10 | End: 2024-09-12

## 2024-09-10 RX ORDER — OXYCODONE AND ACETAMINOPHEN 7.5; 325 MG/1; MG/1
1 TABLET ORAL ONCE
Refills: 0 | Status: DISCONTINUED | OUTPATIENT
Start: 2024-09-10 | End: 2024-09-12

## 2024-09-10 RX ADMIN — HYDROMORPHONE HYDROCHLORIDE 0.5 MILLIGRAM(S): 2 TABLET ORAL at 16:16

## 2024-09-10 RX ADMIN — ACETAMINOPHEN 650 MILLIGRAM(S): 325 TABLET ORAL at 19:07

## 2024-09-10 RX ADMIN — HYDROMORPHONE HYDROCHLORIDE 0.5 MILLIGRAM(S): 2 TABLET ORAL at 23:03

## 2024-09-10 RX ADMIN — ACETAMINOPHEN 650 MILLIGRAM(S): 325 TABLET ORAL at 23:02

## 2024-09-10 RX ADMIN — Medication 400 MILLIGRAM(S): at 21:29

## 2024-09-10 RX ADMIN — CEFAZOLIN SODIUM 100 MILLIGRAM(S): 2 INJECTION, SOLUTION INTRAVENOUS at 21:29

## 2024-09-10 RX ADMIN — HYDROMORPHONE HYDROCHLORIDE 0.5 MILLIGRAM(S): 2 TABLET ORAL at 17:14

## 2024-09-10 RX ADMIN — HYDROMORPHONE HYDROCHLORIDE 0.5 MILLIGRAM(S): 2 TABLET ORAL at 16:06

## 2024-09-10 NOTE — BRIEF OPERATIVE NOTE - NSICDXBRIEFPROCEDURE_GEN_ALL_CORE_FT
PROCEDURES:  Total arthroplasty of left ankle 10-Sep-2024 15:38:49  Kennedy Holden repair of lateral ligament of ankle 10-Sep-2024 15:39:06  Kennedy Holden  Gastrocnemius recession 10-Sep-2024 15:39:13  Kennedy Holden

## 2024-09-10 NOTE — PATIENT PROFILE ADULT - HAVE YOU EXPERIENCED VIOLENCE OR A TRAUMATIC EVENT?
Pain Level: 0 Treatment Number (Optional): 4th Location 1: buttocks Time (Minutes - Will Only Render If Nonzero): [address] Lencho Barney Applicator Size: standard applicator Number Of Applicators Used: 2 Treatment Administered By (Optional): Rupert Gasca Number Of Applicators Used: 1 Device: CoolTone device Time (Minutes - Will Only Render If Nonzero): 30 Intro: CoolTone Treatment: The CoolTone system targets specific muscle groups to create greater mass and defintion. By delivering electromagnetic energy deep into the muscle tissue, supramaximal contractions are produced leading to greater muscle strength, more toned muscles, and visible muscle definition. Time (Minutes - Will Only Render If Nonzero): 27 Post Treatment: After treatment, the applicator was removed from the skin. Detail Level: Zone Maximum Level Of Energy Including Units (Optional): 100% Consent: Written consent obtained, risks reviewed including, but not limited to, blistering from suction, darker or lighter pigmentary change, bruising, and/or need for multiple treatments. Minimum Level Of Energy Including Units (Optional): 50% no

## 2024-09-10 NOTE — PRE-ANESTHESIA EVALUATION ADULT - MALLAMPATI CLASS
Pt K level is 8.1 now. Ordered insulin and dextrose. Notified Nephrology.   Pt will be transferred to icu for close monitoring.  intensivist stated he will place dialysis catheter. Updated nephrologist, pt will receive dialysis once catheter in place.  Attempt to call daughter POA to update and get consent for dialysis catheter placement and initiation of dialysis but was unsuccessful. We will proceed with this plan since it is an emergency.    Class III - visualization of the soft palate and the base of the uvula

## 2024-09-10 NOTE — PATIENT PROFILE ADULT - FALL HARM RISK - HARM RISK INTERVENTIONS

## 2024-09-11 ENCOUNTER — TRANSCRIPTION ENCOUNTER (OUTPATIENT)
Age: 59
End: 2024-09-11

## 2024-09-11 RX ORDER — AMITRIPTYLINE HCL 25 MG
50 TABLET ORAL AT BEDTIME
Refills: 0 | Status: DISCONTINUED | OUTPATIENT
Start: 2024-09-11 | End: 2024-09-12

## 2024-09-11 RX ORDER — ROSUVASTATIN CALCIUM 10 MG/1
20 TABLET ORAL AT BEDTIME
Refills: 0 | Status: DISCONTINUED | OUTPATIENT
Start: 2024-09-11 | End: 2024-09-12

## 2024-09-11 RX ORDER — ENOXAPARIN SODIUM 100 MG/ML
40 INJECTION SUBCUTANEOUS
Qty: 0 | Refills: 0 | DISCHARGE
Start: 2024-09-11

## 2024-09-11 RX ORDER — TRAMADOL HYDROCHLORIDE 200 MG/1
1 TABLET, EXTENDED RELEASE ORAL
Qty: 0 | Refills: 0 | DISCHARGE
Start: 2024-09-11

## 2024-09-11 RX ORDER — GUAIFENESIN/DEXTROMETHORPHAN 100-10MG/5
10 SYRUP ORAL EVERY 8 HOURS
Refills: 0 | Status: DISCONTINUED | OUTPATIENT
Start: 2024-09-11 | End: 2024-09-12

## 2024-09-11 RX ORDER — AMLODIPINE BESYLATE 10 MG/1
10 TABLET ORAL DAILY
Refills: 0 | Status: DISCONTINUED | OUTPATIENT
Start: 2024-09-11 | End: 2024-09-12

## 2024-09-11 RX ORDER — ACETAMINOPHEN 325 MG/1
2 TABLET ORAL
Qty: 0 | Refills: 0 | DISCHARGE

## 2024-09-11 RX ORDER — TEMAZEPAM 30 MG/1
15 CAPSULE ORAL AT BEDTIME
Refills: 0 | Status: DISCONTINUED | OUTPATIENT
Start: 2024-09-11 | End: 2024-09-12

## 2024-09-11 RX ORDER — METOPROLOL TARTRATE 100 MG/1
25 TABLET ORAL DAILY
Refills: 0 | Status: DISCONTINUED | OUTPATIENT
Start: 2024-09-11 | End: 2024-09-12

## 2024-09-11 RX ADMIN — ACETAMINOPHEN 650 MILLIGRAM(S): 325 TABLET ORAL at 05:20

## 2024-09-11 RX ADMIN — Medication 30 MILLILITER(S): at 05:21

## 2024-09-11 RX ADMIN — ACETAMINOPHEN 650 MILLIGRAM(S): 325 TABLET ORAL at 12:27

## 2024-09-11 RX ADMIN — ACETAMINOPHEN 650 MILLIGRAM(S): 325 TABLET ORAL at 13:46

## 2024-09-11 RX ADMIN — Medication 400 MILLIGRAM(S): at 21:25

## 2024-09-11 RX ADMIN — CEFAZOLIN SODIUM 100 MILLIGRAM(S): 2 INJECTION, SOLUTION INTRAVENOUS at 05:20

## 2024-09-11 RX ADMIN — Medication 400 MILLIGRAM(S): at 14:07

## 2024-09-11 RX ADMIN — Medication 50 MILLIGRAM(S): at 21:24

## 2024-09-11 RX ADMIN — ACETAMINOPHEN 650 MILLIGRAM(S): 325 TABLET ORAL at 18:05

## 2024-09-11 RX ADMIN — ACETAMINOPHEN 650 MILLIGRAM(S): 325 TABLET ORAL at 06:20

## 2024-09-11 RX ADMIN — Medication 400 MILLIGRAM(S): at 05:20

## 2024-09-11 RX ADMIN — Medication 400 MILLIGRAM(S): at 06:20

## 2024-09-11 RX ADMIN — CEFAZOLIN SODIUM 100 MILLIGRAM(S): 2 INJECTION, SOLUTION INTRAVENOUS at 14:07

## 2024-09-11 RX ADMIN — Medication 100 MILLILITER(S): at 07:01

## 2024-09-11 RX ADMIN — ROSUVASTATIN CALCIUM 20 MILLIGRAM(S): 10 TABLET ORAL at 21:24

## 2024-09-11 RX ADMIN — TEMAZEPAM 15 MILLIGRAM(S): 30 CAPSULE ORAL at 23:13

## 2024-09-11 RX ADMIN — ENOXAPARIN SODIUM 40 MILLIGRAM(S): 100 INJECTION SUBCUTANEOUS at 18:05

## 2024-09-11 RX ADMIN — Medication 10 MILLILITER(S): at 21:49

## 2024-09-11 RX ADMIN — Medication 400 MILLIGRAM(S): at 23:00

## 2024-09-11 RX ADMIN — ACETAMINOPHEN 650 MILLIGRAM(S): 325 TABLET ORAL at 23:13

## 2024-09-11 NOTE — PHYSICAL THERAPY INITIAL EVALUATION ADULT - ADDITIONAL COMMENTS
Per patient, there are 8/ landing/ 1 step outside with  (L) rail going up to enter home, 14 steps with (R) rail going up to bedroom/bathroom, 12 steps with wall on each side to go down laundry; also has a raised living room and sunken kitchen

## 2024-09-11 NOTE — DISCHARGE NOTE PROVIDER - HOSPITAL COURSE
59 year old male admitted for an elective total ankle arthroplasty. The patient tolerated surgery well with no intra/post operative complications. The patient received intra/post operative antibiotics for infection prophylaxis and will be discharged on enoxaparin to lower the risk of blood clots. The patient worked with Physical Therapy while admitted to the hospital and is stable for discharge.

## 2024-09-11 NOTE — PHYSICAL THERAPY INITIAL EVALUATION ADULT - MANUAL MUSCLE TESTING RESULTS, REHAB EVAL
BUE/RLE ms strength grossly 3 to 3+/5; (L) hip/knee 3- to 3/5; (L) ankle/foot kept in splint/ace wrap

## 2024-09-11 NOTE — DISCHARGE NOTE PROVIDER - NSDCFUSCHEDAPPT_GEN_ALL_CORE_FT
Lauro Schilling  [facility] Physician [facility]  [address]  Scheduled Appointment: 09/25/2024

## 2024-09-11 NOTE — PHYSICAL THERAPY INITIAL EVALUATION ADULT - PERTINENT HX OF CURRENT PROBLEM, REHAB EVAL
60 y/o male admitted with diagnosis of primary OA (L) ankle/foot, underwent total arthroplasty of (L) ankle, Brostrom repair of lateral ligament of ankle, Gastrocnemius recession on 9/10/24

## 2024-09-11 NOTE — DISCHARGE NOTE PROVIDER - NSDCMRMEDTOKEN_GEN_ALL_CORE_FT
acetaminophen 500 mg oral tablet: 2 tab(s) orally 4 times a day as needed for  mild pain  amitriptyline 50 mg oral tablet: 1 tab(s) orally once a day (at bedtime)  amLODIPine 10 mg oral tablet: 1 tab(s) orally once a day  aspirin 81 mg oral tablet: 1 tab(s) orally once a day  enoxaparin: 40 milligram(s) subcutaneous once a day  ibuprofen 400 mg oral tablet: 1 tab(s) orally 2 times a day as needed for  moderate pain  methocarbamol 500 mg oral tablet: 2 tab(s) orally as needed for  muscle spasm  metoprolol succinate 25 mg oral tablet, extended release: 1 tab(s) orally once a day  rosuvastatin 20 mg oral capsule: 1 cap(s) orally once a day  traMADol 50 mg oral tablet: 1 tab(s) orally every 6 hours As needed Moderate Pain (4 - 6)

## 2024-09-11 NOTE — PHYSICAL THERAPY INITIAL EVALUATION ADULT - GAIT DEVIATIONS NOTED, PT EVAL
stooped posture, dec heel strike/pushoff/decreased cheng/decreased step length/decreased weight-shifting ability

## 2024-09-11 NOTE — DISCHARGE NOTE PROVIDER - NSDCCPCAREPLAN_GEN_ALL_CORE_FT
PRINCIPAL DISCHARGE DIAGNOSIS  Diagnosis: Arthritis of left ankle  Assessment and Plan of Treatment:

## 2024-09-11 NOTE — PHYSICAL THERAPY INITIAL EVALUATION ADULT - GENERAL OBSERVATIONS, REHAB EVAL
11:50-12;20 Chart reviewed. Pt encountered semireclined in bed, may be seen by Physical Therapist as confirmed with Nurse. Patient denied pain and ready to get up now; +IV lock LUE/ Ace wrap/ splint (L) ankle

## 2024-09-11 NOTE — DISCHARGE NOTE PROVIDER - CARE PROVIDER_API CALL
Lauro Schilling  Orthopaedic Surgery  [address] [place] [address]  Phone: [phone]  Fax: [phone]  Follow Up Time:

## 2024-09-11 NOTE — PHYSICAL THERAPY INITIAL EVALUATION ADULT - ACTIVE RANGE OF MOTION EXAMINATION, REHAB EVAL
LLE required AAROM/AROM except for (L) ankle/foot kept in splint /ace wrap/bilateral upper extremity Active ROM was WFL (within functional limits)/Right LE Active ROM was WFL (within functional limits)

## 2024-09-12 ENCOUNTER — TRANSCRIPTION ENCOUNTER (OUTPATIENT)
Age: 59
End: 2024-09-12

## 2024-09-12 VITALS
TEMPERATURE: 98 F | RESPIRATION RATE: 18 BRPM | DIASTOLIC BLOOD PRESSURE: 56 MMHG | HEART RATE: 78 BPM | OXYGEN SATURATION: 96 % | SYSTOLIC BLOOD PRESSURE: 145 MMHG

## 2024-09-12 RX ADMIN — Medication 10 MILLILITER(S): at 13:09

## 2024-09-12 RX ADMIN — Medication 10 MILLILITER(S): at 05:38

## 2024-09-12 RX ADMIN — ACETAMINOPHEN 650 MILLIGRAM(S): 325 TABLET ORAL at 06:45

## 2024-09-12 RX ADMIN — Medication 400 MILLIGRAM(S): at 14:39

## 2024-09-12 RX ADMIN — Medication 400 MILLIGRAM(S): at 13:09

## 2024-09-12 RX ADMIN — Medication 400 MILLIGRAM(S): at 05:38

## 2024-09-12 RX ADMIN — ACETAMINOPHEN 650 MILLIGRAM(S): 325 TABLET ORAL at 05:38

## 2024-09-12 RX ADMIN — ACETAMINOPHEN 650 MILLIGRAM(S): 325 TABLET ORAL at 13:09

## 2024-09-12 RX ADMIN — METOPROLOL TARTRATE 25 MILLIGRAM(S): 100 TABLET ORAL at 05:38

## 2024-09-12 RX ADMIN — ACETAMINOPHEN 650 MILLIGRAM(S): 325 TABLET ORAL at 00:00

## 2024-09-12 RX ADMIN — ENOXAPARIN SODIUM 40 MILLIGRAM(S): 100 INJECTION SUBCUTANEOUS at 18:08

## 2024-09-12 RX ADMIN — AMLODIPINE BESYLATE 10 MILLIGRAM(S): 10 TABLET ORAL at 05:37

## 2024-09-12 RX ADMIN — Medication 400 MILLIGRAM(S): at 06:45

## 2024-09-12 RX ADMIN — ACETAMINOPHEN 650 MILLIGRAM(S): 325 TABLET ORAL at 14:38

## 2024-09-12 RX ADMIN — ACETAMINOPHEN 650 MILLIGRAM(S): 325 TABLET ORAL at 18:08

## 2024-09-12 NOTE — OCCUPATIONAL THERAPY INITIAL EVALUATION ADULT - NSOTDMEREC_GEN_A_CORE
Pt owns straight cane, reacher, long handled shoehorn, shower chair, grab bar, commode/dressing/bathing

## 2024-09-12 NOTE — DISCHARGE NOTE NURSING/CASE MANAGEMENT/SOCIAL WORK - PATIENT PORTAL LINK FT
You can access the FollowMyHealth Patient Portal offered by [facility] by registering at the following website: [email or web address]. By joining [employer]’s FollowMyHealth portal, you will also be able to view your health information using other applications (apps) compatible with our system.

## 2024-09-12 NOTE — OCCUPATIONAL THERAPY INITIAL EVALUATION ADULT - PERTINENT HX OF CURRENT PROBLEM, REHAB EVAL
Pt admitted for OA (L) ankle or foot. Pt s/p total arthoplasty (L) ankle, Brostrom repair of lateral ligament of ankle, Gastrocnemius recession POD2; general anesthesia

## 2024-09-12 NOTE — OCCUPATIONAL THERAPY INITIAL EVALUATION ADULT - LIVES WITH, PROFILE
Wife in a private home + 8 steps to landing to 1 step to enter with left railing + 14 steps going up to bedroom and bathroom with right handrail + 12 steps to go down to laundry room with walls on each side + walk in shower + shower chair + grab bar/spouse

## 2024-09-12 NOTE — OCCUPATIONAL THERAPY INITIAL EVALUATION ADULT - ADDITIONAL COMMENTS
PLOF obtained from patient. Pt owns straight cane, reacher, long handled shoehorn, shower chair, grab bar, commode.

## 2024-09-12 NOTE — PROGRESS NOTE ADULT - SUBJECTIVE AND OBJECTIVE BOX
59y Male POD #  1    S/P left total ankle arthroplasty    Patient seen and examined at bedside . The patient is awake and alert in NAD. No complaints of chest pain, SOB, N/V.  Pain is controlled, the patient has voided.  Requesting str as his wife is disabled as well, and has stairs at home.     PAST MEDICAL & SURGICAL HISTORY:  Hypertension    High cholesterol    Smoker    Depression    Migraine headache    JOSE ROBERTO on CPAP    Obesity (BMI 30-39.9)    H/O insomnia    History of arthroscopy of right knee    S/P right rotator cuff repair          MEDICATIONS  (STANDING):  acetaminophen     Tablet .. 650 milliGRAM(s) Oral every 6 hours  ceFAZolin   IVPB 2000 milliGRAM(s) IV Intermittent every 8 hours  chlorhexidine 2% Cloths 1 Application(s) Topical <User Schedule>  enoxaparin Injectable 40 milliGRAM(s) SubCutaneous every 24 hours  ibuprofen  Tablet. 400 milliGRAM(s) Oral every 8 hours  influenza   Vaccine 0.5 milliLiter(s) IntraMuscular once  lactated ringers. 1000 milliLiter(s) (100 mL/Hr) IV Continuous <Continuous>    MEDICATIONS  (PRN):  diphenhydrAMINE 25 milliGRAM(s) Oral at bedtime PRN Insomnia  magnesium hydroxide Suspension 30 milliLiter(s) Oral daily PRN Constipation  meperidine     Injectable 12.5 milliGRAM(s) IV Push once PRN Shivering  ondansetron Injectable 4 milliGRAM(s) IV Push once PRN Nausea and/or Vomiting  oxycodone    5 mG/acetaminophen 325 mG 1 Tablet(s) Oral once PRN Mild Pain (1 - 3)  traMADol 50 milliGRAM(s) Oral every 6 hours PRN Moderate Pain (4 - 6)        Vital Signs Last 24 Hrs  T(C): 36.8 (11 Sep 2024 04:42), Max: 36.8 (11 Sep 2024 04:42)  T(F): 98.3 (11 Sep 2024 04:42), Max: 98.3 (11 Sep 2024 04:42)  HR: 65 (11 Sep 2024 04:42) (58 - 80)  BP: 137/65 (11 Sep 2024 04:42) (121/58 - 156/58)  BP(mean): --  RR: 16 (11 Sep 2024 04:42) (16 - 18)  SpO2: 97% (11 Sep 2024 04:42) (94% - 98%)                      PE:  The patient was seen and examined at bedside          A&OX3, NAD          left ankle splint C/D/I          Compartments soft, foot well perfused          NVI, SILT           A/P:     # POD #  1     s/p left total ankle arthroplasty                - OOB to Chair   -PT/OT - NWB LLE  -post op abx   -Pain control - per pain protocol   -Incentive Spirometry   -DVT Prophylaxis - enoxaparin  -GI ppx-  -discharge planning- patient requesting str                          
  59y Male POD #  2    S/P left total ankle arthroplasty    Patient seen and examined at bedside . The patient is awake and alert in NAD. No complaints of chest pain, SOB, N/V.  Pain is controlled, the patient has voided, worked with PT. He states that he is feeling much better today, pain is less.     PAST MEDICAL & SURGICAL HISTORY:  Hypertension    High cholesterol    Smoker    Depression    Migraine headache    JOSE ROBERTO on CPAP    Obesity (BMI 30-39.9)    H/O insomnia    History of arthroscopy of right knee    S/P right rotator cuff repair          MEDICATIONS  (STANDING):  acetaminophen     Tablet .. 650 milliGRAM(s) Oral every 6 hours  ceFAZolin   IVPB 2000 milliGRAM(s) IV Intermittent every 8 hours  chlorhexidine 2% Cloths 1 Application(s) Topical <User Schedule>  enoxaparin Injectable 40 milliGRAM(s) SubCutaneous every 24 hours  ibuprofen  Tablet. 400 milliGRAM(s) Oral every 8 hours  influenza   Vaccine 0.5 milliLiter(s) IntraMuscular once  lactated ringers. 1000 milliLiter(s) (100 mL/Hr) IV Continuous <Continuous>    MEDICATIONS  (PRN):  diphenhydrAMINE 25 milliGRAM(s) Oral at bedtime PRN Insomnia  magnesium hydroxide Suspension 30 milliLiter(s) Oral daily PRN Constipation  meperidine     Injectable 12.5 milliGRAM(s) IV Push once PRN Shivering  ondansetron Injectable 4 milliGRAM(s) IV Push once PRN Nausea and/or Vomiting  oxycodone    5 mG/acetaminophen 325 mG 1 Tablet(s) Oral once PRN Mild Pain (1 - 3)  traMADol 50 milliGRAM(s) Oral every 6 hours PRN Moderate Pain (4 - 6)      Vital Signs Last 24 Hrs  T(C): 36.5 (12 Sep 2024 05:00), Max: 36.6 (11 Sep 2024 14:02)  T(F): 97.7 (12 Sep 2024 05:00), Max: 97.9 (11 Sep 2024 14:02)  HR: 81 (12 Sep 2024 05:00) (64 - 81)  BP: 155/67 (12 Sep 2024 05:00) (119/63 - 155/67)  BP(mean): --  RR: 18 (12 Sep 2024 05:00) (18 - 18)  SpO2: 95% (12 Sep 2024 05:00) (95% - 96%)    Parameters below as of 11 Sep 2024 20:51  Patient On (Oxygen Delivery Method): room air            PE:  The patient was seen and examined at bedside          A&OX3, NAD          left ankle splint C/D/I          Compartments soft, foot well perfused          NVI, SILT           A/P:     # POD #  2     s/p left total ankle arthroplasty                - OOB to Chair   -PT/OT - NWB LLE  -post op abx   -Pain control - per pain protocol   -Incentive Spirometry   -DVT Prophylaxis - enoxaparin  -GI ppx-  -discharge planning- str pending authorization

## 2024-09-12 NOTE — OCCUPATIONAL THERAPY INITIAL EVALUATION ADULT - GENERAL OBSERVATIONS, REHAB EVAL
13:50-14:20; chart reviewed, ok to treat by Occupational Therapist as confirmed by JONATHON Garland, Pt received seated in ASU recliner + ace bandage (L) foot +heplock in NAD. Pt reported 6/10 (L) pain; RN aware. Pt in agreement with OT IE.

## 2024-09-17 DIAGNOSIS — Z88.0 ALLERGY STATUS TO PENICILLIN: ICD-10-CM

## 2024-09-17 DIAGNOSIS — G47.33 OBSTRUCTIVE SLEEP APNEA (ADULT) (PEDIATRIC): ICD-10-CM

## 2024-09-17 DIAGNOSIS — Z91.81 HISTORY OF FALLING: ICD-10-CM

## 2024-09-17 DIAGNOSIS — E78.00 PURE HYPERCHOLESTEROLEMIA, UNSPECIFIED: ICD-10-CM

## 2024-09-17 DIAGNOSIS — F17.210 NICOTINE DEPENDENCE, CIGARETTES, UNCOMPLICATED: ICD-10-CM

## 2024-09-17 DIAGNOSIS — I10 ESSENTIAL (PRIMARY) HYPERTENSION: ICD-10-CM

## 2024-09-17 DIAGNOSIS — E66.9 OBESITY, UNSPECIFIED: ICD-10-CM

## 2024-09-17 DIAGNOSIS — M67.02 SHORT ACHILLES TENDON (ACQUIRED), LEFT ANKLE: ICD-10-CM

## 2024-09-17 DIAGNOSIS — M19.172 POST-TRAUMATIC OSTEOARTHRITIS, LEFT ANKLE AND FOOT: ICD-10-CM

## 2024-09-17 DIAGNOSIS — M25.372 OTHER INSTABILITY, LEFT ANKLE: ICD-10-CM

## 2024-09-17 DIAGNOSIS — G43.909 MIGRAINE, UNSPECIFIED, NOT INTRACTABLE, WITHOUT STATUS MIGRAINOSUS: ICD-10-CM

## 2024-09-17 DIAGNOSIS — Z79.82 LONG TERM (CURRENT) USE OF ASPIRIN: ICD-10-CM

## 2024-09-17 DIAGNOSIS — Z79.899 OTHER LONG TERM (CURRENT) DRUG THERAPY: ICD-10-CM

## 2024-09-17 DIAGNOSIS — F32.A DEPRESSION, UNSPECIFIED: ICD-10-CM

## 2024-09-25 ENCOUNTER — APPOINTMENT (OUTPATIENT)
Dept: ORTHOPEDIC SURGERY | Facility: CLINIC | Age: 59
End: 2024-09-25
Payer: OTHER MISCELLANEOUS

## 2024-09-25 PROBLEM — E66.9 OBESITY, UNSPECIFIED: Chronic | Status: ACTIVE | Noted: 2024-08-30

## 2024-09-25 PROBLEM — F17.200 NICOTINE DEPENDENCE, UNSPECIFIED, UNCOMPLICATED: Chronic | Status: ACTIVE | Noted: 2024-08-30

## 2024-09-25 PROBLEM — G43.909 MIGRAINE, UNSPECIFIED, NOT INTRACTABLE, WITHOUT STATUS MIGRAINOSUS: Chronic | Status: ACTIVE | Noted: 2024-08-30

## 2024-09-25 PROBLEM — F32.A DEPRESSION, UNSPECIFIED: Chronic | Status: ACTIVE | Noted: 2024-08-30

## 2024-09-25 PROBLEM — Z87.898 PERSONAL HISTORY OF OTHER SPECIFIED CONDITIONS: Chronic | Status: ACTIVE | Noted: 2024-08-30

## 2024-09-25 PROBLEM — G47.33 OBSTRUCTIVE SLEEP APNEA (ADULT) (PEDIATRIC): Chronic | Status: ACTIVE | Noted: 2024-08-30

## 2024-09-25 PROCEDURE — 99024 POSTOP FOLLOW-UP VISIT: CPT

## 2024-09-25 PROCEDURE — 73610 X-RAY EXAM OF ANKLE: CPT | Mod: LT

## 2024-09-25 NOTE — DATA REVIEWED
[FreeTextEntry1] : 3 views of the patient's left ankle ordered reviewed by me personally.  X-rays demonstrate evidence of a stable prosthesis and congruent ankle.  No interval displacement.

## 2024-09-25 NOTE — DISCUSSION/SUMMARY
[de-identified] : I would like to keep the sutures in for 1 more week.  I placed a Unna boot dressing and the patient will remain nonweightbearing until I see him back.  All questions sought and answered.

## 2024-09-25 NOTE — HISTORY OF PRESENT ILLNESS
[de-identified] : Patient 2 weeks status post left total ankle arthroplasty.  He is doing well.  He has pain but it is getting more manageable.  He denies any fevers chills.

## 2024-09-25 NOTE — PHYSICAL EXAM
[de-identified] : Incision is well-healed.  No infection.  He has passive range of motion which is full.  He has full sensation distally.  Neurovascular intact distally.

## 2024-10-02 ENCOUNTER — APPOINTMENT (OUTPATIENT)
Dept: ORTHOPEDIC SURGERY | Facility: CLINIC | Age: 59
End: 2024-10-02
Payer: OTHER MISCELLANEOUS

## 2024-10-02 ENCOUNTER — NON-APPOINTMENT (OUTPATIENT)
Age: 59
End: 2024-10-02

## 2024-10-02 DIAGNOSIS — M19.072 PRIMARY OSTEOARTHRITIS, LEFT ANKLE AND FOOT: ICD-10-CM

## 2024-10-02 PROCEDURE — 99024 POSTOP FOLLOW-UP VISIT: CPT

## 2024-10-02 PROCEDURE — L4361: CPT | Mod: LT

## 2024-10-02 NOTE — DISCUSSION/SUMMARY
[de-identified] : Sutures and staples removed.  Steri-Strips placed.  The patient can begin washing his wound.  He can weight-bear as tolerated in the boot and once he is able to do so safely he can be discharged home.  All questions sought and answered.

## 2024-10-02 NOTE — HISTORY OF PRESENT ILLNESS
[de-identified] : 59-year-old patient comes to see me today in regards to his postoperative appointment for his left ankle.  He is doing well.  The patient has discomfort at rest but otherwise much improved compared with last visit.

## 2024-10-02 NOTE — PHYSICAL EXAM
[de-identified] : Incision is well-healed.  No evidence of infection.  Grossly intact range of motion.  Neurovascular intact distally.

## 2024-10-16 ENCOUNTER — RESULT CHARGE (OUTPATIENT)
Age: 59
End: 2024-10-16

## 2024-10-16 ENCOUNTER — APPOINTMENT (OUTPATIENT)
Dept: ORTHOPEDIC SURGERY | Facility: CLINIC | Age: 59
End: 2024-10-16
Payer: OTHER MISCELLANEOUS

## 2024-10-16 DIAGNOSIS — M19.072 PRIMARY OSTEOARTHRITIS, LEFT ANKLE AND FOOT: ICD-10-CM

## 2024-10-16 PROCEDURE — 73610 X-RAY EXAM OF ANKLE: CPT | Mod: LT

## 2024-10-16 PROCEDURE — 99024 POSTOP FOLLOW-UP VISIT: CPT

## 2024-10-16 RX ORDER — SULFAMETHOXAZOLE AND TRIMETHOPRIM 800; 160 MG/1; MG/1
800-160 TABLET ORAL TWICE DAILY
Qty: 14 | Refills: 0 | Status: ACTIVE | COMMUNITY
Start: 2024-10-16 | End: 1900-01-01

## 2024-10-17 ENCOUNTER — TRANSCRIPTION ENCOUNTER (OUTPATIENT)
Age: 59
End: 2024-10-17

## 2024-11-04 ENCOUNTER — APPOINTMENT (OUTPATIENT)
Facility: CLINIC | Age: 59
End: 2024-11-04
Payer: OTHER MISCELLANEOUS

## 2024-11-04 DIAGNOSIS — M19.072 PRIMARY OSTEOARTHRITIS, LEFT ANKLE AND FOOT: ICD-10-CM

## 2024-11-04 PROCEDURE — 73610 X-RAY EXAM OF ANKLE: CPT | Mod: LT

## 2024-11-04 PROCEDURE — 99024 POSTOP FOLLOW-UP VISIT: CPT

## 2024-12-04 ENCOUNTER — APPOINTMENT (OUTPATIENT)
Dept: ORTHOPEDIC SURGERY | Facility: CLINIC | Age: 59
End: 2024-12-04
Payer: OTHER MISCELLANEOUS

## 2024-12-04 DIAGNOSIS — M20.5X1 OTHER DEFORMITIES OF TOE(S) (ACQUIRED), RIGHT FOOT: ICD-10-CM

## 2024-12-04 PROCEDURE — 73630 X-RAY EXAM OF FOOT: CPT | Mod: RT

## 2024-12-04 PROCEDURE — 73610 X-RAY EXAM OF ANKLE: CPT | Mod: LT

## 2024-12-04 PROCEDURE — 99024 POSTOP FOLLOW-UP VISIT: CPT

## 2024-12-06 DIAGNOSIS — M19.072 PRIMARY OSTEOARTHRITIS, LEFT ANKLE AND FOOT: ICD-10-CM

## 2025-01-13 ENCOUNTER — APPOINTMENT (OUTPATIENT)
Facility: CLINIC | Age: 60
End: 2025-01-13
Payer: OTHER MISCELLANEOUS

## 2025-01-13 DIAGNOSIS — M19.072 PRIMARY OSTEOARTHRITIS, LEFT ANKLE AND FOOT: ICD-10-CM

## 2025-01-13 PROCEDURE — 73610 X-RAY EXAM OF ANKLE: CPT | Mod: LT

## 2025-01-13 PROCEDURE — 99213 OFFICE O/P EST LOW 20 MIN: CPT

## 2025-02-03 ENCOUNTER — APPOINTMENT (OUTPATIENT)
Facility: CLINIC | Age: 60
End: 2025-02-03
Payer: OTHER MISCELLANEOUS

## 2025-02-03 ENCOUNTER — RESULT CHARGE (OUTPATIENT)
Age: 60
End: 2025-02-03

## 2025-02-03 DIAGNOSIS — M19.072 PRIMARY OSTEOARTHRITIS, LEFT ANKLE AND FOOT: ICD-10-CM

## 2025-02-03 PROCEDURE — 73610 X-RAY EXAM OF ANKLE: CPT | Mod: LT

## 2025-02-03 PROCEDURE — 99213 OFFICE O/P EST LOW 20 MIN: CPT

## 2025-03-05 ENCOUNTER — APPOINTMENT (OUTPATIENT)
Dept: ORTHOPEDIC SURGERY | Facility: CLINIC | Age: 60
End: 2025-03-05
Payer: OTHER MISCELLANEOUS

## 2025-03-05 DIAGNOSIS — M19.072 PRIMARY OSTEOARTHRITIS, LEFT ANKLE AND FOOT: ICD-10-CM

## 2025-03-05 PROCEDURE — 99213 OFFICE O/P EST LOW 20 MIN: CPT

## 2025-04-09 ENCOUNTER — APPOINTMENT (OUTPATIENT)
Dept: ORTHOPEDIC SURGERY | Facility: CLINIC | Age: 60
End: 2025-04-09
Payer: OTHER MISCELLANEOUS

## 2025-04-09 ENCOUNTER — RESULT CHARGE (OUTPATIENT)
Age: 60
End: 2025-04-09

## 2025-04-09 DIAGNOSIS — M19.072 PRIMARY OSTEOARTHRITIS, LEFT ANKLE AND FOOT: ICD-10-CM

## 2025-04-09 PROCEDURE — 99213 OFFICE O/P EST LOW 20 MIN: CPT

## 2025-04-09 PROCEDURE — 73610 X-RAY EXAM OF ANKLE: CPT | Mod: LT

## 2025-04-23 ENCOUNTER — APPOINTMENT (OUTPATIENT)
Dept: ORTHOPEDIC SURGERY | Facility: CLINIC | Age: 60
End: 2025-04-23
Payer: OTHER MISCELLANEOUS

## 2025-04-23 ENCOUNTER — APPOINTMENT (OUTPATIENT)
Dept: ORTHOPEDIC SURGERY | Facility: CLINIC | Age: 60
End: 2025-04-23

## 2025-04-23 DIAGNOSIS — M89.8X9 OTHER SPECIFIED DISORDERS OF BONE, UNSPECIFIED SITE: ICD-10-CM

## 2025-04-23 PROCEDURE — 99214 OFFICE O/P EST MOD 30 MIN: CPT

## 2025-04-25 DIAGNOSIS — M19.072 PRIMARY OSTEOARTHRITIS, LEFT ANKLE AND FOOT: ICD-10-CM

## 2025-04-25 DIAGNOSIS — M20.5X1 OTHER DEFORMITIES OF TOE(S) (ACQUIRED), RIGHT FOOT: ICD-10-CM

## 2025-05-23 ENCOUNTER — RESULT REVIEW (OUTPATIENT)
Age: 60
End: 2025-05-23

## 2025-05-23 ENCOUNTER — OUTPATIENT (OUTPATIENT)
Dept: OUTPATIENT SERVICES | Facility: HOSPITAL | Age: 60
LOS: 1 days | End: 2025-05-23
Payer: COMMERCIAL

## 2025-05-23 DIAGNOSIS — Z98.890 OTHER SPECIFIED POSTPROCEDURAL STATES: Chronic | ICD-10-CM

## 2025-05-23 DIAGNOSIS — M89.8X9 OTHER SPECIFIED DISORDERS OF BONE, UNSPECIFIED SITE: ICD-10-CM

## 2025-05-23 PROCEDURE — 78803 RP LOCLZJ TUM SPECT 1 AREA: CPT

## 2025-05-23 PROCEDURE — 78315 BONE IMAGING 3 PHASE: CPT | Mod: 26,59

## 2025-05-23 PROCEDURE — A9503: CPT

## 2025-05-23 PROCEDURE — 78803 RP LOCLZJ TUM SPECT 1 AREA: CPT | Mod: 26

## 2025-05-23 PROCEDURE — 78315 BONE IMAGING 3 PHASE: CPT

## 2025-05-24 DIAGNOSIS — M89.8X9 OTHER SPECIFIED DISORDERS OF BONE, UNSPECIFIED SITE: ICD-10-CM

## 2025-05-27 ENCOUNTER — NON-APPOINTMENT (OUTPATIENT)
Age: 60
End: 2025-05-27

## 2025-05-27 PROBLEM — T84.59XA: Status: ACTIVE | Noted: 2025-05-27

## 2025-06-04 ENCOUNTER — RESULT REVIEW (OUTPATIENT)
Age: 60
End: 2025-06-04

## 2025-06-04 ENCOUNTER — OUTPATIENT (OUTPATIENT)
Dept: OUTPATIENT SERVICES | Facility: HOSPITAL | Age: 60
LOS: 1 days | End: 2025-06-04
Payer: COMMERCIAL

## 2025-06-04 ENCOUNTER — APPOINTMENT (OUTPATIENT)
Dept: ORTHOPEDIC SURGERY | Facility: CLINIC | Age: 60
End: 2025-06-04
Payer: OTHER MISCELLANEOUS

## 2025-06-04 VITALS
DIASTOLIC BLOOD PRESSURE: 73 MMHG | OXYGEN SATURATION: 97 % | SYSTOLIC BLOOD PRESSURE: 160 MMHG | TEMPERATURE: 98 F | HEIGHT: 71 IN | HEART RATE: 65 BPM | WEIGHT: 244.93 LBS | RESPIRATION RATE: 16 BRPM

## 2025-06-04 DIAGNOSIS — T84.59XA INFECTION AND INFLAMMATORY REACTION DUE TO OTHER INTERNAL JOINT PROSTHESIS, INITIAL ENCOUNTER: ICD-10-CM

## 2025-06-04 DIAGNOSIS — M89.8X9 OTHER SPECIFIED DISORDERS OF BONE, UNSPECIFIED SITE: ICD-10-CM

## 2025-06-04 DIAGNOSIS — Z98.890 OTHER SPECIFIED POSTPROCEDURAL STATES: Chronic | ICD-10-CM

## 2025-06-04 DIAGNOSIS — T84.59XD INFECTION AND INFLAMMATORY REACTION DUE TO OTHER INTERNAL JOINT PROSTHESIS, SUBSEQUENT ENCOUNTER: ICD-10-CM

## 2025-06-04 DIAGNOSIS — M25.9 JOINT DISORDER, UNSPECIFIED: Chronic | ICD-10-CM

## 2025-06-04 DIAGNOSIS — Z01.818 ENCOUNTER FOR OTHER PREPROCEDURAL EXAMINATION: ICD-10-CM

## 2025-06-04 LAB
ALBUMIN SERPL ELPH-MCNC: 4.6 G/DL — SIGNIFICANT CHANGE UP (ref 3.5–5.2)
ALP SERPL-CCNC: 99 U/L — SIGNIFICANT CHANGE UP (ref 30–115)
ALT FLD-CCNC: 17 U/L — SIGNIFICANT CHANGE UP (ref 0–41)
ANION GAP SERPL CALC-SCNC: 11 MMOL/L — SIGNIFICANT CHANGE UP (ref 7–14)
AST SERPL-CCNC: 18 U/L — SIGNIFICANT CHANGE UP (ref 0–41)
BILIRUB SERPL-MCNC: 0.4 MG/DL — SIGNIFICANT CHANGE UP (ref 0.2–1.2)
BUN SERPL-MCNC: 14 MG/DL — SIGNIFICANT CHANGE UP (ref 10–20)
CALCIUM SERPL-MCNC: 9.3 MG/DL — SIGNIFICANT CHANGE UP (ref 8.4–10.5)
CHLORIDE SERPL-SCNC: 105 MMOL/L — SIGNIFICANT CHANGE UP (ref 98–110)
CO2 SERPL-SCNC: 24 MMOL/L — SIGNIFICANT CHANGE UP (ref 17–32)
CREAT SERPL-MCNC: 0.8 MG/DL — SIGNIFICANT CHANGE UP (ref 0.7–1.5)
EGFR: 100 ML/MIN/1.73M2 — SIGNIFICANT CHANGE UP
EGFR: 100 ML/MIN/1.73M2 — SIGNIFICANT CHANGE UP
GLUCOSE SERPL-MCNC: 112 MG/DL — HIGH (ref 70–99)
HCT VFR BLD CALC: 42.2 % — SIGNIFICANT CHANGE UP (ref 42–52)
HGB BLD-MCNC: 14 G/DL — SIGNIFICANT CHANGE UP (ref 14–18)
MCHC RBC-ENTMCNC: 28.6 PG — SIGNIFICANT CHANGE UP (ref 27–31)
MCHC RBC-ENTMCNC: 33.2 G/DL — SIGNIFICANT CHANGE UP (ref 32–37)
MCV RBC AUTO: 86.1 FL — SIGNIFICANT CHANGE UP (ref 80–94)
NRBC BLD AUTO-RTO: 0 /100 WBCS — SIGNIFICANT CHANGE UP (ref 0–0)
PLATELET # BLD AUTO: 206 K/UL — SIGNIFICANT CHANGE UP (ref 130–400)
PMV BLD: 9.6 FL — SIGNIFICANT CHANGE UP (ref 7.4–10.4)
POTASSIUM SERPL-MCNC: 4.3 MMOL/L — SIGNIFICANT CHANGE UP (ref 3.5–5)
POTASSIUM SERPL-SCNC: 4.3 MMOL/L — SIGNIFICANT CHANGE UP (ref 3.5–5)
PROT SERPL-MCNC: 7.1 G/DL — SIGNIFICANT CHANGE UP (ref 6–8)
RBC # BLD: 4.9 M/UL — SIGNIFICANT CHANGE UP (ref 4.7–6.1)
RBC # FLD: 13.7 % — SIGNIFICANT CHANGE UP (ref 11.5–14.5)
SODIUM SERPL-SCNC: 140 MMOL/L — SIGNIFICANT CHANGE UP (ref 135–146)
WBC # BLD: 8.1 K/UL — SIGNIFICANT CHANGE UP (ref 4.8–10.8)
WBC # FLD AUTO: 8.1 K/UL — SIGNIFICANT CHANGE UP (ref 4.8–10.8)

## 2025-06-04 PROCEDURE — 93010 ELECTROCARDIOGRAM REPORT: CPT

## 2025-06-04 PROCEDURE — 99214 OFFICE O/P EST MOD 30 MIN: CPT

## 2025-06-04 PROCEDURE — 80053 COMPREHEN METABOLIC PANEL: CPT

## 2025-06-04 PROCEDURE — 36415 COLL VENOUS BLD VENIPUNCTURE: CPT

## 2025-06-04 PROCEDURE — 99214 OFFICE O/P EST MOD 30 MIN: CPT | Mod: 25

## 2025-06-04 PROCEDURE — 72050 X-RAY EXAM NECK SPINE 4/5VWS: CPT | Mod: 26

## 2025-06-04 PROCEDURE — 93005 ELECTROCARDIOGRAM TRACING: CPT

## 2025-06-04 PROCEDURE — 72050 X-RAY EXAM NECK SPINE 4/5VWS: CPT

## 2025-06-04 PROCEDURE — 85027 COMPLETE CBC AUTOMATED: CPT

## 2025-06-04 RX ORDER — AMITRIPTYLINE HYDROCHLORIDE 25 MG/1
1 TABLET, FILM COATED ORAL
Refills: 0 | DISCHARGE

## 2025-06-04 RX ORDER — ROSUVASTATIN CALCIUM 20 MG/1
1 TABLET, FILM COATED ORAL
Refills: 0 | DISCHARGE

## 2025-06-04 RX ORDER — LISINOPRIL 5 MG/1
1 TABLET ORAL
Refills: 0 | DISCHARGE

## 2025-06-04 RX ORDER — METOPROLOL SUCCINATE 50 MG/1
1 TABLET, EXTENDED RELEASE ORAL
Refills: 0 | DISCHARGE

## 2025-06-04 NOTE — H&P PST ADULT - HISTORY OF PRESENT ILLNESS
60 year old male here for LEFT ANKLE REMOVAL OF TOTAL ANKLE PROSTHESIS DEBRIDEMENT OF BONE/FASCIA/MUSCLE AND PLACEMENT OF ANTIBIOTIC SPACER - SUPINE (LARGE C ARM) TO BE ADMITTED POST SURGERY /TRANSFERRED TO REHAB FACILTY, Stated orginal started swelling in ankle. Patient fell at work april 6th 2021,Had titanium ankle replaced and  during  physical therapy 3x weekly started with swelling and pain.This all started 3-4 months ago, and ct with contrast noted the ankle was pooled in blood inside of leg and found may have infection so has to come out.  FOS=0 fell in past 2 years frequent  On excursion chest pain + sob + plap  Denies recent uri or UTI   Anesthesia Alert  YES--Difficult Airway  NO--History of neck surgery or radiation  YES--Limited ROM of neck  NO--History of Malignant hyperthermia  NO--Personal or family history of Pseudocholinesterase deficiency  NO--Prior Anesthesia Complication  NO--Latex Allergy  NO--Loose teeth  NO--History of Rheumatoid Arthritis  YES--JOSE ROBERTO- does not use c pap  NO BLEEDING RISK  NO--Other_____    As per patient, this is their complete medical and surgical history, including medications both prescribed or over the counter.  Patient verbalized understanding of instructions and was given the opportunity to ask questions and have them answered.    0 points  RCRI Score  3.9 %  Risk of major cardiac event    1.75 points  The higher the score (maximum 58.2), the higher the functional status.  2.96 METs  '

## 2025-06-04 NOTE — H&P PST ADULT - REASON FOR ADMISSION
LEFT ANKLE REMOVAL OF TOTAL ANKLE PROSTHESIS DEBRIDEMENT OF BONE/FASCIA/MUSCLE AND PLACEMENT OF ANTIBIOTIC SPACER - SUPINE (LARGE C ARM) TO BE ADMITTED POST SURGERY /TRANSFERRED TO REHAB FACILTY

## 2025-06-04 NOTE — H&P PST ADULT - NSICDXPASTSURGICALHX_GEN_ALL_CORE_FT
PAST SURGICAL HISTORY:  Disorder of left ankle joint     History of arthroscopy of right knee     S/P right rotator cuff repair

## 2025-06-05 DIAGNOSIS — Z01.818 ENCOUNTER FOR OTHER PREPROCEDURAL EXAMINATION: ICD-10-CM

## 2025-06-05 DIAGNOSIS — T84.59XD INFECTION AND INFLAMMATORY REACTION DUE TO OTHER INTERNAL JOINT PROSTHESIS, SUBSEQUENT ENCOUNTER: ICD-10-CM

## 2025-06-24 ENCOUNTER — APPOINTMENT (OUTPATIENT)
Dept: ORTHOPEDIC SURGERY | Facility: AMBULATORY SURGERY CENTER | Age: 60
End: 2025-06-24

## 2025-06-25 ENCOUNTER — APPOINTMENT (OUTPATIENT)
Dept: ORTHOPEDIC SURGERY | Facility: CLINIC | Age: 60
End: 2025-06-25